# Patient Record
(demographics unavailable — no encounter records)

---

## 2024-10-09 NOTE — PLAN
[FreeTextEntry1] : Labs Drawn by Dr. Isaias Burch due to poor venous access.  Patient required lab testing due to conditions in their past medical history requiring periodic monitoring.  Labs were sent to [facility].  EKG - NSR -72  , SAMI - 0.188 , No acute T wave changes noted..  Patient to continue with present medications - all medications reconciled/reviewed during this visit.  Increase fluid intake.  RTO in 7-10 days for re-evaluation.   I, Rodrick Putnam, attest that this documentation has been prepared under the direction and in the presence of Provider Isaias Burch DNP  The documentation recorded by the scribe, in my presence, accurately reflects the service I personally performed, and the decisions made by me with my edits as appropriate. Isaias Burch DNP

## 2024-10-09 NOTE — HEALTH RISK ASSESSMENT
[0] : 2) Feeling down, depressed, or hopeless: Not at all (0) [PHQ-2 Negative - No further assessment needed] : PHQ-2 Negative - No further assessment needed [Time Spent: ___ Minutes] : I spent [unfilled] minutes performing a depression screening for this patient. [PapSmearDate] : 11/2023 [ColonoscopyDate] : 10/08/2024 [ColonoscopyComments] : 1 polyp

## 2024-10-09 NOTE — HISTORY OF PRESENT ILLNESS
[FreeTextEntry1] : annual wellness visit [de-identified] : Patient presents today for physical exam. Her last PE was over a year ago and since that time she has not had any significant changes to her medical history. Denies any CP, SOB or diff breathing. Denies abdominal pain, blood in stool, or changes in bowel habits. Denies any fever, chills, cough or cold type symptoms. Has no other complaints at this time.

## 2024-10-22 NOTE — PLAN
[FreeTextEntry1] : Labs reviewed with patient during this encounter.  Chol = 196 from 209  Patient to continue with present medications - all medications reconciled/reviewed during this visit and listed above Patient to start Vitamin therapy as discussed during visit Increase fluid intake..  RTO for repeat labs in 6 months.  RTO in 6 months for re-evaluation.  Diet teaching for at least 8 minutes.performed in depth during this visit related to cholesterol and cardiovascular risks. At least 25 minutes was spent with patient via video conference reviewing findings/results during this visit. Ample time was provided to answer any questions or address concerns to the patients satisfaction..  Patient was advised that this audiovisual encounter constitutes a billable visit, and that the visit will be billed on the same terms and conditions as an in-office, face-to-face visit. Patient was further advised they may be responsible for any co-payment, co-insurance, or other self-payment they would normally pay for an office visit, unless such self-payment was waived by their insurance carrier.

## 2024-10-22 NOTE — HISTORY OF PRESENT ILLNESS
[Home] : at home, [unfilled] , at the time of the visit. [Medical Office: ([facility])___] : at the medical office located in  [Verbal consent obtained from patient] : the patient, [unfilled] [FreeTextEntry1] : Cholesterol